# Patient Record
Sex: FEMALE | Race: WHITE | NOT HISPANIC OR LATINO
[De-identification: names, ages, dates, MRNs, and addresses within clinical notes are randomized per-mention and may not be internally consistent; named-entity substitution may affect disease eponyms.]

---

## 2023-09-20 ENCOUNTER — DASHBOARD ENCOUNTERS (OUTPATIENT)
Age: 23
End: 2023-09-20

## 2023-09-20 ENCOUNTER — OFFICE VISIT (OUTPATIENT)
Dept: URBAN - METROPOLITAN AREA CLINIC 96 | Facility: CLINIC | Age: 23
End: 2023-09-20
Payer: COMMERCIAL

## 2023-09-20 VITALS
HEART RATE: 95 BPM | HEIGHT: 60 IN | WEIGHT: 115.2 LBS | SYSTOLIC BLOOD PRESSURE: 110 MMHG | TEMPERATURE: 97.9 F | DIASTOLIC BLOOD PRESSURE: 77 MMHG | BODY MASS INDEX: 22.62 KG/M2

## 2023-09-20 DIAGNOSIS — K50.819 CROHN'S DISEASE OF SMALL AND LARGE INTESTINES WITH COMPLICATION: ICD-10-CM

## 2023-09-20 PROBLEM — 71833008: Status: ACTIVE | Noted: 2023-09-20

## 2023-09-20 PROCEDURE — 99204 OFFICE O/P NEW MOD 45 MIN: CPT | Performed by: INTERNAL MEDICINE

## 2023-09-20 PROCEDURE — 99244 OFF/OP CNSLTJ NEW/EST MOD 40: CPT | Performed by: INTERNAL MEDICINE

## 2023-09-20 RX ORDER — LISDEXAMFETAMINE DIMESYLATE 50 MG/1
TAKE 1 CAPSULE BY MOUTH EVERY DAY CAPSULE ORAL
Qty: 30 EACH | Refills: 0 | Status: ACTIVE | COMMUNITY

## 2023-09-20 RX ORDER — USTEKINUMAB 90 MG/ML
INJECTION, SOLUTION SUBCUTANEOUS
Qty: 1 MILLILITER | Status: ACTIVE | COMMUNITY

## 2023-09-20 RX ORDER — BUPROPION HYDROCHLORIDE 150 MG/1
TAKE 1 TABLET BY MOUTH EVERY DAY TABLET, EXTENDED RELEASE ORAL
Qty: 90 EACH | Refills: 2 | Status: ACTIVE | COMMUNITY

## 2023-09-20 NOTE — HPI-TODAY'S VISIT:
Patient being seen in consultation as requested by Dr. Oliver Roth for Crohn's disease, needs transition to adult GI.  A copy of this document will be sent to the requesting physician.  24 yo female reports diagnosed with Crohns at age 10. Prior therapy included Remicade with loss of response, no response to prior MTX. On Stelera for the past 2 years and doing well with such. Per mother, Crohns of the small bowel and colon. EGD and colonoscopy most recently done several years ago. Did show active inflammation. No endoscopic eval since on Stelara.   Recent labs done within the past month. Normal per patient.   Patient reports doing well. No rectal bleeding, normal formed stools. No unintentional weight loss. Regular menstrual cycles. No chance pregnant. No STI.  No herbal meds or supplements. No chronic NSAIDs.   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  Last dose 9/7/2023 of Stelara , every 8 weeks.

## 2023-12-19 ENCOUNTER — OFFICE VISIT (OUTPATIENT)
Dept: URBAN - METROPOLITAN AREA CLINIC 96 | Facility: CLINIC | Age: 23
End: 2023-12-19